# Patient Record
Sex: MALE | Race: WHITE | Employment: STUDENT | URBAN - METROPOLITAN AREA
[De-identification: names, ages, dates, MRNs, and addresses within clinical notes are randomized per-mention and may not be internally consistent; named-entity substitution may affect disease eponyms.]

---
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
## 2023-02-14 ENCOUNTER — HOSPITAL ENCOUNTER (EMERGENCY)
Age: 18
Discharge: HOME | End: 2023-02-14
Attending: STUDENT IN AN ORGANIZED HEALTH CARE EDUCATION/TRAINING PROGRAM
Payer: COMMERCIAL

## 2023-02-14 VITALS
DIASTOLIC BLOOD PRESSURE: 57 MMHG | HEIGHT: 71 IN | SYSTOLIC BLOOD PRESSURE: 125 MMHG | BODY MASS INDEX: 21.98 KG/M2 | HEART RATE: 68 BPM | TEMPERATURE: 97.7 F | WEIGHT: 157 LBS | RESPIRATION RATE: 14 BRPM | OXYGEN SATURATION: 98 %

## 2023-02-14 DIAGNOSIS — L02.91 ABSCESS: Primary | ICD-10-CM

## 2023-02-14 PROCEDURE — 99283 EMERGENCY DEPT VISIT LOW MDM: CPT

## 2023-02-14 RX ORDER — DOXYCYCLINE HYCLATE 100 MG
100 TABLET ORAL 2 TIMES DAILY
Qty: 14 TABLET | Refills: 0 | Status: SHIPPED | OUTPATIENT
Start: 2023-02-14 | End: 2023-02-21

## 2023-02-14 RX ORDER — ONDANSETRON 4 MG/1
4 TABLET, ORALLY DISINTEGRATING ORAL 3 TIMES DAILY PRN
Qty: 21 TABLET | Refills: 0 | Status: SHIPPED | OUTPATIENT
Start: 2023-02-14

## 2023-02-14 ASSESSMENT — PAIN - FUNCTIONAL ASSESSMENT
PAIN_FUNCTIONAL_ASSESSMENT: NONE - DENIES PAIN
PAIN_FUNCTIONAL_ASSESSMENT: NONE - DENIES PAIN

## 2023-02-14 NOTE — ED NOTES
Patient given prescription,  discharge instructions verbal and written, patient verbalized understanding. Alert/oriented X4, Clear speech.   Patient exhibits no distress, ambulates with steady gait per self leaving unit, no further request.      Fariha Hearn RN  02/14/23 1380

## 2023-02-14 NOTE — DISCHARGE INSTRUCTIONS
You were seen in the emergency department with concern for abscess. You did have 2 tiny abscesses near your buttock but I do not feel these need to be drained at this time. I would like to put you on antibiotics which should help clear the infection. Please return to the emergency department if you develop any new, ongoing or worsening symptoms. We hope you feel better soon!

## 2023-02-14 NOTE — ED PROVIDER NOTES
Emergency Department Provider Note  Location: [address] [place] [address]  2/14/2023     Patient Identification  Magan Miranda. is a 21 y.o. male    Chief Complaint  Abscess (Buttock area)      Mode of Arrival  private car    HPI  (History provided by patient)  This is a 21 y.o. male with a PMH significant for Asthma, prior TBI  presented today for abscess near the buttock area. Symptoms have been ongoing for the last week and a half. Patient reportedly told his mother earlier today that he saw blood in his pants after using the restroom. Mother noted tiny little sores that were bleeding. Patient reports pain when sitting. Denies any fever, vomiting, difficulty with bowel movements, dysuria, hematuria or change in urinary frequency. Patient denies prior episodes. ROS  Review of Systems   All other systems reviewed and are negative. I have reviewed the following nursing documentation:  Allergies: Allergies   Allergen Reactions    Clindamycin Nausea And Vomiting       Past medical history:  has a past medical history of Asthma, Brain injury, and H/O blood clots. Past surgical history:  has a past surgical history that includes Tympanostomy tube placement; brain surgery; and chest tube insertion. Home medications:   Prior to Admission medications    Medication Sig Start Date End Date Taking?  Authorizing Provider   doxycycline hyclate (VIBRA-TABS) 100 MG tablet Take 1 tablet by mouth 2 times daily for 7 days 2/14/23 2/21/23 Yes Gunjan Daniels MD   ondansetron (ZOFRAN-ODT) 4 MG disintegrating tablet Take 1 tablet by mouth 3 times daily as needed for Nausea or Vomiting 2/14/23  Yes Gunjan Daniels MD   hydrOXYzine HCl (ATARAX) 25 MG tablet  9/27/22   Historical Provider, MD   Multiple Vitamin (MULTIVITAMIN) TABS  11/23/22   Historical Provider, MD   BANOPHEN 25 MG capsule  11/9/22   Historical Provider, MD   diphenhydrAMINE (SOMINEX) 25 MG tablet TAKE ONE TABLET BY MOUTH EVERY NIGHT AT BEDTIME 9/20/22   Historical Provider, MD   ibuprofen (ADVIL;MOTRIN) 600 MG tablet Take 1 tablet by mouth every 6 hours as needed for Pain 11/29/22   Emma Hollis PA-C   amLODIPine (NORVASC) 5 MG tablet Take 5 mg by mouth daily  Patient not taking: No sig reported    Historical Provider, MD   cloNIDine (CATAPRES) 0.1 MG tablet Take 0.1 mg by mouth 2 times daily    Historical Provider, MD   prazosin (MINIPRESS) 1 MG capsule Take 1 mg by mouth nightly    Historical Provider, MD   ibuprofen (ADVIL;MOTRIN) 600 MG tablet Take 1 tablet by mouth every 6 hours as needed for Pain 1/4/20   Kee Swift MD       Social history:  reports that he has never smoked. He has been exposed to tobacco smoke. He has never used smokeless tobacco. He reports current drug use. Drug: Marijuana Dary Matthew). He reports that he does not drink alcohol. Family history:  History reviewed. No pertinent family history. Exam  ED Triage Vitals   BP Temp Temp src Pulse Resp SpO2 Height Weight   -- -- -- -- -- -- -- --     Physical Exam  Vitals and nursing note reviewed. Constitutional:       General: He is not in acute distress. HENT:      Head: Normocephalic and atraumatic. Right Ear: External ear normal.      Left Ear: External ear normal.      Nose: Nose normal.      Mouth/Throat:      Pharynx: Oropharynx is clear. Eyes:      Extraocular Movements: Extraocular movements intact. Conjunctiva/sclera: Conjunctivae normal.   Cardiovascular:      Rate and Rhythm: Normal rate and regular rhythm. Pulses: Normal pulses. Heart sounds: Normal heart sounds. Pulmonary:      Effort: Pulmonary effort is normal.      Breath sounds: Normal breath sounds. Abdominal:      General: There is no distension. Palpations: Abdomen is soft. Tenderness: There is no abdominal tenderness. There is no guarding or rebound. Genitourinary:     Comments: 2 small areas of induration with no purulence.   There is small surrounding area of cellulitis. Both areas are punctate and over follicle. No fluctuance noted. Musculoskeletal:         General: Normal range of motion. Cervical back: Normal range of motion and neck supple. Skin:     Capillary Refill: Capillary refill takes less than 2 seconds. Neurological:      General: No focal deficit present. Mental Status: He is alert. Cranial Nerves: No cranial nerve deficit. Psychiatric:         Mood and Affect: Mood normal.         Behavior: Behavior normal.           MDM/ED Course  ED Medication Orders (From admission, onward)      None            Radiology  No results found. Labs  No results found for this visit on 02/14/23.      - Patient seen and evaluated in room 36.  21 y.o. male presented for concern for abscess near buttock region. He presented hypertensive but vitals otherwise unremarkable. On exam he had 2 small areas of induration but no fluctuance. No evidence of perianal or perirectal abscess. No significant cellulitis. No evidence of fornieres gangrene. I opted not to I&D given no significant fluid collection. Did not obtain imaging or labs given no signs symptoms concerning for sepsis or deeper space fluid collection.   - Patient did not require telemetry while in the ED  - Pertinent old records reviewed. - Patient has PCP follow up  - Social determinants include significant TBI in the past. No alcohol or drug use. No unstable housing environment. - Patient did not require medication while in the ED. Upon reassessment, patient remained hemodynamically stable. - I discussed the results with patient and family member patient and mother. We agreed to discharge home. Patient with 2 acute areas of induration with no drainable abscess. No evidence of cellulitis. - Return precautions also discussed. patient and family member patient and mother verbalized understanding of care plan and agreed to follow-up with PCP as advised.           Clinical Impression:  1. Abscess          Disposition:  Discharge to home in good condition. Blood pressure (!) 125/57, pulse 68, temperature 97.7 °F (36.5 °C), resp. rate 14, height 5' 11\" (1.803 m), weight 157 lb (71.2 kg), SpO2 98 %. Patient was given scripts for the following medications. I counseled patient how to take these medications. Discharge Medication List as of 2/14/2023  2:43 PM        START taking these medications    Details   doxycycline hyclate (VIBRA-TABS) 100 MG tablet Take 1 tablet by mouth 2 times daily for 7 days, Disp-14 tablet, R-0Print      ondansetron (ZOFRAN-ODT) 4 MG disintegrating tablet Take 1 tablet by mouth 3 times daily as needed for Nausea or Vomiting, Disp-21 tablet, R-0Print             Disposition referral (if applicable):  No follow-up provider specified. This chart was generated in part by using Dragon Dictation system and may contain errors related to that system including errors in grammar, punctuation, and spelling, as well as words and phrases that may be inappropriate. If there are any questions or concerns please feel free to contact the dictating provider for clarification.      Ondina Ventura MD  [facility]        Ondina Ventura MD  02/16/23 8606

## 2023-02-14 NOTE — ED NOTES
Patient to ed with complaints of a abscess on his buttock which started approx 2 weeks ago.      Oliver Alexandra RN  02/14/23 6650

## 2024-01-15 ENCOUNTER — HOSPITAL ENCOUNTER (EMERGENCY)
Age: 19
Discharge: HOME | End: 2024-01-15
Payer: COMMERCIAL

## 2024-01-15 VITALS
SYSTOLIC BLOOD PRESSURE: 122 MMHG | OXYGEN SATURATION: 97 % | HEIGHT: 72 IN | BODY MASS INDEX: 21.75 KG/M2 | RESPIRATION RATE: 14 BRPM | DIASTOLIC BLOOD PRESSURE: 66 MMHG | WEIGHT: 160.6 LBS | TEMPERATURE: 99 F | HEART RATE: 90 BPM

## 2024-01-15 DIAGNOSIS — Q18.1 CYST ON EAR: Primary | ICD-10-CM

## 2024-01-15 PROCEDURE — 99282 EMERGENCY DEPT VISIT SF MDM: CPT

## 2024-01-15 NOTE — ED PROVIDER NOTES
[facility] EMERGENCY DEPARTMENT  EMERGENCY DEPARTMENT ENCOUNTER        Pt Name: Magan Lopez Jr.  MRN: [id number]  Birthdate 2005  Date of evaluation: 1/15/2024  Provider: SANDI Diaz  PCP: [facility]  Note Started: 4:31 PM EST       NATI. I have evaluated this patient.      CHIEF COMPLAINT       Chief Complaint   Patient presents with    Abscess     Pt presents with an abscess on R earlobe.        HISTORY OF PRESENT ILLNESS      Chief Complaint: Right lobe swelling    Magan Lopez Jr. is a 18 y.o. male who presents to the [facility] part for evaluation of right earlobe swelling.  Has a history of a piercing in that ear long ago, but stopped wearing his ear ring.    Reports several instances of that earlobe swelling, and then swelling subsiding on its own.  Reports increased swelling today.  Denies fevers or chills.  Reports minor discomfort.  No surrounding erythema.    SCREENINGS    Orchard Coma Scale  Eye Opening: Spontaneous  Best Verbal Response: Oriented  Best Motor Response: Obeys commands  Orchard Coma Scale Score: 15      Is this patient to be included in the SEP-1 Core Measure due to severe sepsis or septic shock?   No   Exclusion criteria - the patient is NOT to be included for SEP-1 Core Measure due to:  Infection is not suspected      PHYSICAL EXAM     Vitals: /66   Pulse 90   Temp 99 °F (37.2 °C) (Oral)   Resp 14   Ht 1.829 m (6')   Wt 72.8 kg (160 lb 9.6 oz)   SpO2 97%   BMI 21.78 kg/m²    General: awake, alert, no apparent distress  Pupils: equal, reactive  Head: Right earlobe cyst. Non tender. No erythema or abscess.   Neuro: no facial droop, no slurred speech, answers questions appropriately.   Skin: Warm. No visible rash, lesions, or bruising       DIAGNOSTIC RESULTS   LABS:    Labs Reviewed - No data to display    EKG: When ordered, EKG's are interpreted by the Emergency Department Physician in the absence of a cardiologist.  Please see

## 2024-07-14 ENCOUNTER — APPOINTMENT (OUTPATIENT)
Dept: GENERAL RADIOLOGY | Age: 19
End: 2024-07-14
Payer: COMMERCIAL

## 2024-07-14 ENCOUNTER — HOSPITAL ENCOUNTER (EMERGENCY)
Age: 19
Discharge: HOME | End: 2024-07-14
Attending: EMERGENCY MEDICINE
Payer: COMMERCIAL

## 2024-07-14 VITALS
TEMPERATURE: 98.4 F | OXYGEN SATURATION: 97 % | BODY MASS INDEX: 22.63 KG/M2 | DIASTOLIC BLOOD PRESSURE: 51 MMHG | WEIGHT: 167.11 LBS | RESPIRATION RATE: 20 BRPM | HEART RATE: 90 BPM | HEIGHT: 72 IN | SYSTOLIC BLOOD PRESSURE: 100 MMHG

## 2024-07-14 DIAGNOSIS — S60.222A CONTUSION OF LEFT HAND, INITIAL ENCOUNTER: Primary | ICD-10-CM

## 2024-07-14 PROCEDURE — 73130 X-RAY EXAM OF HAND: CPT

## 2024-07-14 PROCEDURE — 99283 EMERGENCY DEPT VISIT LOW MDM: CPT

## 2024-07-14 NOTE — DISCHARGE INSTRUCTIONS
Ice will help.  Rest and Motrin for pain.  See your doctor in the next several days for recheck and return if worse.

## 2024-07-14 NOTE — ED PROVIDER NOTES
[facility] EMERGENCY DEPARTMENT  [facility]      Pt Name: Magan Lopez Jr.  MRN: [id number]  Birthdate 2005  Date ofevaluation: 7/14/2024  Provider: Travis Pittman MD    CHIEF COMPLAINT     Left hand swelling      HPI    HISTORY OF PRESENT ILLNESS   (Location/Symptom, Timing/Onset,Context/Setting, Quality, Duration, Modifying Factors, Severity)  Note limiting factors.   Magan Lopez Jr. is a 19 y.o. male who presents to the emergency department with left hand swelling.  This is a 19-year-old male who states he had a seizure yesterday injuring his left hand.  The patient was initially taken to [facility].  He denies any other complaints.        NursingNotes were reviewed.    Review of Systems    REVIEW OF SYSTEMS    (2-9 systems for level 4, 10 or more for level 5)     Review of Systems   Constitutional: Negative for fever.   HENT: Negative for rhinorrhea and sore throat.    Eyes: Negative for redness.   Respiratory: Negative for shortness of breath.    Cardiovascular: Negative for chest pain.   Gastrointestinal: Negative for abdominal pain.   Genitourinary: Negative for flank pain.   Neurological: Negative for headaches.   Hematological: Negative for adenopathy.   Psychiatric/Behavioral: Negative for confusion.              Except as noted above the remainder of the review of systems was reviewed and negative.       PAST MEDICAL HISTORY     Past Medical History:   Diagnosis Date    Asthma     Brain injury (HCC)     H/O blood clots          SURGICALHISTORY       Past Surgical History:   Procedure Laterality Date    BRAIN SURGERY      CHEST TUBE INSERTION      TYMPANOSTOMY TUBE PLACEMENT           CURRENT MEDICATIONS       Previous Medications    AMLODIPINE (NORVASC) 5 MG TABLET    Take 5 mg by mouth daily    BANOPHEN 25 MG CAPSULE        CLONIDINE (CATAPRES) 0.1 MG TABLET    Take 0.1 mg by mouth 2 times daily    DIPHENHYDRAMINE (SOMINEX) 25 MG TABLET    TAKE ONE TABLET BY

## 2025-01-15 ENCOUNTER — HOSPITAL ENCOUNTER (EMERGENCY)
Age: 20
Discharge: HOME | End: 2025-01-15
Attending: EMERGENCY MEDICINE
Payer: COMMERCIAL

## 2025-01-15 VITALS
TEMPERATURE: 98.1 F | SYSTOLIC BLOOD PRESSURE: 129 MMHG | RESPIRATION RATE: 12 BRPM | DIASTOLIC BLOOD PRESSURE: 62 MMHG | HEART RATE: 71 BPM | OXYGEN SATURATION: 99 %

## 2025-01-15 DIAGNOSIS — Z48.02 VISIT FOR SUTURE REMOVAL: Primary | ICD-10-CM

## 2025-01-15 PROCEDURE — 99282 EMERGENCY DEPT VISIT SF MDM: CPT

## 2025-01-15 ASSESSMENT — PAIN - FUNCTIONAL ASSESSMENT: PAIN_FUNCTIONAL_ASSESSMENT: NONE - DENIES PAIN

## 2025-01-15 NOTE — ED PROVIDER NOTES
dailyHistorical Med      prazosin (MINIPRESS) 1 MG capsule Take 1 mg by mouth nightlyHistorical Med      !! ibuprofen (ADVIL;MOTRIN) 600 MG tablet Take 1 tablet by mouth every 6 hours as needed for Pain, Disp-30 tablet, R-0Print       !! - Potential duplicate medications found. Please discuss with provider.          ALLERGIES     Clindamycin    FAMILYHISTORY     History reviewed. No pertinent family history.     SOCIAL HISTORY       Social History     Tobacco Use    Smoking status: Never     Passive exposure: Yes    Smokeless tobacco: Never   Substance Use Topics    Alcohol use: Never    Drug use: Yes     Types: Marijuana (Weed)     Comment: has medical marijuana card       SCREENINGS        Nate Coma Scale  Eye Opening: Spontaneous  Best Verbal Response: Oriented  Best Motor Response: Obeys commands  Edelstein Coma Scale Score: 15                CIWA Assessment  BP: 129/62  Pulse: 71           PHYSICAL EXAM  1 or more Elements     ED Triage Vitals [01/15/25 1440]   BP Systolic BP Percentile Diastolic BP Percentile Temp Temp Source Pulse Respirations SpO2   129/62 -- -- 98.1 °F (36.7 °C) Oral 71 12 99 %      Height Weight         -- --             Physical Exam    My pulse oximetry interpretation is which is within the normal range    3 interrupted sutures to right eyebrow without cellulitis, erythema. Good approximation of wound. No abscess        DIAGNOSTIC RESULTS   LABS:    Labs Reviewed - No data to display    When ordered only abnormal lab results are displayed. All other labs were within normal range or not returned as of this dictation.    EKG:     RADIOLOGY:   Non-plain film images such as CT, Ultrasound and MRI are read by the radiologist. Plain radiographic images are visualized and preliminarily interpreted by the ED Provider with the below findings:        Interpretation per the Radiologist below, if available at the time of this note:    No orders to display     No results found.    No results

## 2025-07-17 ENCOUNTER — APPOINTMENT (OUTPATIENT)
Dept: GENERAL RADIOLOGY | Age: 20
End: 2025-07-17
Payer: COMMERCIAL

## 2025-07-17 ENCOUNTER — HOSPITAL ENCOUNTER (EMERGENCY)
Age: 20
Discharge: HOME | End: 2025-07-17
Attending: EMERGENCY MEDICINE
Payer: COMMERCIAL

## 2025-07-17 VITALS
TEMPERATURE: 98 F | DIASTOLIC BLOOD PRESSURE: 56 MMHG | HEART RATE: 66 BPM | WEIGHT: 162.48 LBS | BODY MASS INDEX: 22.01 KG/M2 | RESPIRATION RATE: 16 BRPM | SYSTOLIC BLOOD PRESSURE: 124 MMHG | HEIGHT: 72 IN | OXYGEN SATURATION: 98 %

## 2025-07-17 DIAGNOSIS — S29.011A CHEST WALL MUSCLE STRAIN, INITIAL ENCOUNTER: ICD-10-CM

## 2025-07-17 DIAGNOSIS — S63.502A SPRAIN OF LEFT WRIST, INITIAL ENCOUNTER: ICD-10-CM

## 2025-07-17 DIAGNOSIS — M12.812 ROTATOR CUFF ARTHROPATHY, LEFT: Primary | ICD-10-CM

## 2025-07-17 PROCEDURE — 99283 EMERGENCY DEPT VISIT LOW MDM: CPT

## 2025-07-17 PROCEDURE — 73030 X-RAY EXAM OF SHOULDER: CPT

## 2025-07-17 PROCEDURE — 71046 X-RAY EXAM CHEST 2 VIEWS: CPT

## 2025-07-17 PROCEDURE — 73110 X-RAY EXAM OF WRIST: CPT

## 2025-07-17 RX ORDER — LACOSAMIDE 100 MG/1
100 TABLET ORAL 2 TIMES DAILY
COMMUNITY
Start: 2025-04-28 | End: 2025-08-26

## 2025-07-17 RX ORDER — ARIPIPRAZOLE 10 MG/1
10 TABLET ORAL NIGHTLY
COMMUNITY
Start: 2025-06-20

## 2025-07-17 RX ORDER — LEVETIRACETAM 500 MG/1
1000 TABLET ORAL 2 TIMES DAILY
COMMUNITY
Start: 2025-04-28 | End: 2025-10-25

## 2025-07-17 RX ORDER — TIZANIDINE HYDROCHLORIDE 4 MG/1
4 CAPSULE, GELATIN COATED ORAL 3 TIMES DAILY
COMMUNITY
Start: 2025-01-27

## 2025-07-17 NOTE — ED PROVIDER NOTES
Emergency Department Encounter    Patient: Magan Lopez Jr.  MRN: [id number]  : 2005  Date of Evaluation: 2025  ED Provider:  RILEY ROBLES MD    Triage Chief Complaint:   Shoulder Pain, Wrist Pain, and Rib Pain    [place]:  Magan Lopez Jr. is a 20 y.o. male that presents to the ER for evaluation of left shoulder pain wrist pain and left lateral chest wall pain.  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  Complaint of left lateral chest wall pain wrist pain and shoulder pain after increasing playing sports.    ROS - see HPI, below listed is current ROS at time of my eval:  General:  No fevers  Musculoskeletal:  No muscle pain, no joint pain  Skin:  No rash, no pruritis, no easy bruising  Neurologic:   no extremity numbness, no extremity tingling, no extremity weakness  Extremities:  no edema, no pain    Past Medical History:   Diagnosis Date    Asthma     Brain injury (HCC)     H/O blood clots     Seizures (HCC)      Past Surgical History:   Procedure Laterality Date    BRAIN SURGERY      CHEST TUBE INSERTION      TYMPANOSTOMY TUBE PLACEMENT       No family history on file.  Social History     Socioeconomic History    Marital status: Single     Spouse name: Not on file    Number of children: Not on file    Years of education: Not on file    Highest education level: Not on file   Occupational History    Not on file   Tobacco Use    Smoking status: Never     Passive exposure: Yes    Smokeless tobacco: Never   Substance and Sexual Activity    Alcohol use: Never    Drug use: Yes     Types: Marijuana (Weed)     Comment: has medical marijuana card    Sexual activity: Not on file   Other Topics Concern    Not on file   Social History Narrative    Not on file     Social Drivers of Health     Financial Resource Strain: Low Risk  (2024)    [facility]

## 2025-09-05 ENCOUNTER — HOSPITAL ENCOUNTER (EMERGENCY)
Age: 20
Discharge: HOME | End: 2025-09-05
Attending: EMERGENCY MEDICINE

## 2025-09-05 VITALS
DIASTOLIC BLOOD PRESSURE: 74 MMHG | SYSTOLIC BLOOD PRESSURE: 131 MMHG | OXYGEN SATURATION: 97 % | TEMPERATURE: 99.1 F | WEIGHT: 163.6 LBS | RESPIRATION RATE: 18 BRPM | BODY MASS INDEX: 22.16 KG/M2 | HEIGHT: 72 IN | HEART RATE: 62 BPM

## 2025-09-05 DIAGNOSIS — S91.209A AVULSION OF TOENAIL, INITIAL ENCOUNTER: Primary | ICD-10-CM

## 2025-09-05 RX ORDER — CEPHALEXIN 500 MG/1
500 CAPSULE ORAL 4 TIMES DAILY
Qty: 28 CAPSULE | Refills: 0 | Status: SHIPPED | OUTPATIENT
Start: 2025-09-05 | End: 2025-09-12

## 2025-09-05 ASSESSMENT — PAIN - FUNCTIONAL ASSESSMENT
PAIN_FUNCTIONAL_ASSESSMENT: 0-10
PAIN_FUNCTIONAL_ASSESSMENT: 0-10

## 2025-09-05 ASSESSMENT — PAIN SCALES - GENERAL
PAINLEVEL_OUTOF10: 0
PAINLEVEL_OUTOF10: 0